# Patient Record
(demographics unavailable — no encounter records)

---

## 2024-11-18 NOTE — PLAN
[FreeTextEntry1] : In regards to patients Physical exam, routine blood work drawn, will review results with patient.  Regarding patient's obesity - encourage lifestyle modifications - diet and exercise - reduce calorie intake - no soda/ junk food/ snacks - consider mixed grains/ whole grains, leafy vegetables, and an appropriate serving of protein   Patient will continue Wegovy 2.4mg as prescribed  by [place].   Counseling included abnormal lab results, differential diagnoses, treatment options, risks and benefits, lifestyle changes, current condition, medications, and dose adjustments.  The patient was interactive, attentive, asked questions, and verbalized understanding

## 2024-11-18 NOTE — HISTORY OF PRESENT ILLNESS
[FreeTextEntry1] : physical exam  [de-identified] : Ms. ROBYN JACOME is a 44 year female comes to the office for physical exam. Patient denies fever, cough SOB. No other complaints at this time.

## 2024-11-18 NOTE — REASON FOR VISIT
Anesthesia Evaluation     Patient summary reviewed and Nursing notes reviewed   no history of anesthetic complications:  NPO Solid Status: > 6 hours  NPO Liquid Status: > 6 hours           Airway   Mallampati: II  TM distance: >3 FB  Neck ROM: full  no difficulty expected and No difficulty expected  Dental - normal exam     Pulmonary - negative pulmonary ROS and normal exam    breath sounds clear to auscultation  (-) rhonchi, decreased breath sounds, wheezes, rales, stridor  Cardiovascular - negative cardio ROS and normal exam    NYHA Classification: I  Rhythm: regular  Rate: normal    (-) murmur, weak pulses, friction rub, systolic click, carotid bruits, JVD, peripheral edema      Neuro/Psych  (+) psychiatric history Bipolar,     GI/Hepatic/Renal/Endo    (+)   hepatitis C,     Musculoskeletal (-) negative ROS    Abdominal  - normal exam    Abdomen: soft.   Substance History - negative use     OB/GYN negative ob/gyn ROS         Other - negative ROS                       Anesthesia Plan    ASA 2     general     intravenous induction   Anesthetic plan, all risks, benefits, and alternatives have been provided, discussed and informed consent has been obtained with: patient.      
[Annual Wellness Visit] : an annual wellness visit

## 2024-11-18 NOTE — HEALTH RISK ASSESSMENT
[Good] : ~his/her~  mood as  good [Yes] : Yes [Monthly or less (1 pt)] : Monthly or less (1 point) [1 or 2 (0 pts)] : 1 or 2 (0 points) [Never (0 pts)] : Never (0 points) [No] : In the past 12 months have you used drugs other than those required for medical reasons? No [0] : 2) Feeling down, depressed, or hopeless: Not at all (0) [PHQ-2 Negative - No further assessment needed] : PHQ-2 Negative - No further assessment needed [Audit-CScore] : 1 [[id number]] : 0 [Never] : Never [NO] : No [Patient declined Low Dose CT Scan] : Patient declined Low Dose CT Scan [Patient declined Retinal Exam] : Patient declined Retinal Exam. [Patient reported mammogram was normal] : Patient reported mammogram was normal [Patient declined colonoscopy] : Patient declined colonoscopy [HIV test declined] : HIV test declined [Hepatitis C test declined] : Hepatitis C test declined [MammogramDate] : 08/21

## 2025-02-14 NOTE — HISTORY OF PRESENT ILLNESS
[FreeTextEntry1] : 43 yo p2 (14, 10) here for annual exam lmp- 2016, hyst for fibroids, menomet sis- hyst for adenomyosis, pain, anemia denies vb, dc, pel pain, dyspareunia meds- zetbound lost 65 lbs since starting.  nkda fam hx- frat twin had br ca, chey mastectomies, no recurrence so far.

## 2025-02-25 NOTE — DISCUSSION/SUMMARY
[FreeTextEntry1] : 1. Routine evaluation. No high risk features. Strong family history. Plan for exercise stress test. No need for TTE.  2.  but has lost weight since. Check lipid panel.  3. On Zepbound- plan to check labs.  4. Follow up virtual 4-6 weeks.  [EKG obtained to assist in diagnosis and management of assessed problem(s)] : EKG obtained to assist in diagnosis and management of assessed problem(s)

## 2025-02-25 NOTE — HISTORY OF PRESENT ILLNESS
[FreeTextEntry1] : Went for annual physical. Routine evaluation.  Patient with history of CAD, cardiac arrest in 70s.  Cardiac workup- [facility] 1-2 years prior, radiating pain into chest, nausea, Ruled out for cardiac event and sent to [facility] ED. Chest/abd negative and troponins negative.  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  Using Zepbound and has lost ~65 lbs.  No chest pain, no shortness of breath, no syncope.

## 2025-04-25 NOTE — DISCUSSION/SUMMARY
[FreeTextEntry1] : 1. Routine evaluation. No high risk features. Strong family history. Plan for exercise stress test. 2.  but has lost weight since. Check lipid panel.  3. On Zepbound- plan to check labs.  4. Follow up virtual 4-6 weeks.

## 2025-04-25 NOTE — HISTORY OF PRESENT ILLNESS
[FreeTextEntry1] : Went for annual physical. Routine evaluation.  Patient with history of CAD, cardiac arrest in 70s.  Cardiac workup- [facility] 1-2 years prior, radiating pain into chest, nausea, Ruled out for cardiac event and sent to [facility] ED. Chest/abd negative and troponins negative.  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  Using Zepbound and has lost ~65 lbs.  No chest pain, no shortness of breath, no syncope.   4/2025- Virtual visit

## 2025-05-05 NOTE — HISTORY OF PRESENT ILLNESS
[FreeTextEntry1] : follow up chronic medical conditions and ER follow up. [de-identified] : Ms. ROBYN JACOME is a 45 year female comes to the office for follow up chronic medical conditions. and follow up ofter ER visit.  04/22/25 patient went to [facility] ER for headaches, CT scan was unremarkable, patient was discharged home same day. In office today patient is asymptomatic. Patient denies fever, cough SOB. No other complaints at this time.

## 2025-05-05 NOTE — PLAN
[FreeTextEntry1] : chronic Headaches- patient has follow up with Neurologist Dr. Watters 05/23/25  Regarding patient's obesity - encourage lifestyle modifications - diet and exercise - reduce calorie intake - no soda/ junk food/ snacks - consider mixed grains/ whole grains, leafy vegetables, and an appropriate serving of protein   Patient will continue Zepbound as prescribed  by [facility]  Prior to appointment and during encounter with patient extensive medical records were reviewed including but not limited to, Hospital records, out patient records, laboratory data and microbiology data    Total encounter total time 30 mins >50% of time spent counseling/coordinating care  Counseling included abnormal lab results, differential diagnoses, treatment options, risks and benefits, lifestyle changes, current condition, medications, and dose adjustments.  The patient was interactive, attentive, asked questions, and verbalized understanding

## 2025-05-05 NOTE — HISTORY OF PRESENT ILLNESS
[FreeTextEntry1] : follow up chronic medical conditions and ER follow up. [de-identified] : Ms. ROBYN JACOME is a 45 year female comes to the office for follow up chronic medical conditions. and follow up ofter ER visit.  04/22/25 patient went to [facility] ER for headaches, CT scan was unremarkable, patient was discharged home same day. In office today patient is asymptomatic. Patient denies fever, cough SOB. No other complaints at this time.

## 2025-05-05 NOTE — HEALTH RISK ASSESSMENT
[0] : 2) Feeling down, depressed, or hopeless: Not at all (0) [PHQ-2 Negative - No further assessment needed] : PHQ-2 Negative - No further assessment needed [[id number]] : 0 [Never] : Never

## 2025-05-05 NOTE — PLAN
[FreeTextEntry1] : chronic Headaches- patient has follow up with Neurologist Dr. Watters 05/23/25  Regarding patient's obesity - encourage lifestyle modifications - diet and exercise - reduce calorie intake - no soda/ junk food/ snacks - consider mixed grains/ whole grains, leafy vegetables, and an appropriate serving of protein   Patient will continue Zepbound as prescribed  by [place] [facility]  Prior to appointment and during encounter with patient extensive medical records were reviewed including but not limited to, Hospital records, out patient records, laboratory data and microbiology data    Total encounter total time 30 mins >50% of time spent counseling/coordinating care  Counseling included abnormal lab results, differential diagnoses, treatment options, risks and benefits, lifestyle changes, current condition, medications, and dose adjustments.  The patient was interactive, attentive, asked questions, and verbalized understanding

## 2025-05-05 NOTE — HEALTH RISK ASSESSMENT
[0] : 2) Feeling down, depressed, or hopeless: Not at all (0) [PHQ-2 Negative - No further assessment needed] : PHQ-2 Negative - No further assessment needed [[id number]] : 0 [Never] : Never